# Patient Record
Sex: FEMALE | Race: WHITE | NOT HISPANIC OR LATINO
[De-identification: names, ages, dates, MRNs, and addresses within clinical notes are randomized per-mention and may not be internally consistent; named-entity substitution may affect disease eponyms.]

---

## 2022-08-08 ENCOUNTER — APPOINTMENT (OUTPATIENT)
Dept: AFTER HOURS CARE | Facility: EMERGENCY ROOM | Age: 87
End: 2022-08-08

## 2022-08-08 DIAGNOSIS — J02.8 ACUTE PHARYNGITIS DUE TO OTHER SPECIFIED ORGANISMS: ICD-10-CM

## 2022-08-08 DIAGNOSIS — U07.1 COVID-19: ICD-10-CM

## 2022-08-08 DIAGNOSIS — B97.89 ACUTE PHARYNGITIS DUE TO OTHER SPECIFIED ORGANISMS: ICD-10-CM

## 2022-08-08 PROBLEM — Z00.00 ENCOUNTER FOR PREVENTIVE HEALTH EXAMINATION: Status: ACTIVE | Noted: 2022-08-08

## 2022-08-08 PROCEDURE — 99442: CPT | Mod: CS,95

## 2022-08-08 NOTE — ASSESSMENT
[FreeTextEntry1] : Patient with sore throat, dry cough and improving rash to chest x6 days. Patient tested COVID+ Friday at urgent care, outside window for Paxlovid treatment, without significant risk factors requiring MAB. Patient endorses symptomatic improvement of rash and cough looking for further treatment for sore throat. No SOB, CP, vomiting complaints to warrant ED visit at this time. Patient made aware of concerning symptoms require ED visit and verbalized understanding. Discussed instruction over phone as pt does not have email, wrote down and repeated instructions back to me with good understanding.

## 2022-08-08 NOTE — REVIEW OF SYSTEMS
[Fever] : no fever [Chills] : no chills [Vision Problems] : no vision problems [Nasal Discharge] : nasal discharge [Sore Throat] : sore throat [Postnasal Drip] : postnasal drip [Chest Pain] : no chest pain [Palpitations] : no palpitations [Shortness Of Breath] : no shortness of breath [Cough] : cough [Abdominal Pain] : no abdominal pain [Nausea] : no nausea [Diarrhea] : diarrhea [Vomiting] : no vomiting [Dysuria] : no dysuria [Hematuria] : no hematuria [Joint Pain] : no joint pain [Muscle Pain] : no muscle pain [Itching] : Itching [Skin Rash] : skin rash [Headache] : no headache [Dizziness] : no dizziness

## 2022-08-08 NOTE — HISTORY OF PRESENT ILLNESS
[Home] : at home, [unfilled] , at the time of the visit. [Other Location: e.g. Home (Enter Location, City,State)___] : at [unfilled] [Verbal consent obtained from patient] : the patient, [unfilled] [FreeTextEntry8] : 87y F w/ PMHx Glaucoma complaining of sore throat x6 days. dry cough and mild rash to chest. Sore throat worse in morning and cough keeps her up at night. States she tested COVID+ Friday at an Urgent Care where she was prescribed tessalon perles and cortisone cream for rash. Patient states her rash has improved significantly since she started using topical cream, denies associated vesicles, blisters or drainage. States her cough has also improved since started prescribed cough medication. Denies fever, chills, dizziness, headache, chest pain, sob, n/v/d, dysuria, hematuria, syncope or falls. States she has close follow-up with her PMD regularly but he is currently on vacation x1 week. Is tolerating PO without issue, no difficulty speaking or swallowing.

## 2022-08-08 NOTE — PLAN
[No new medications perscribed] : Treat in place: No new medications prescribed [FreeTextEntry1] : -Continue with tessalon perles, and cortisone cream as prescribed\par -Tea w/ honey, elevation of head when sleeping\par -Tylenol as needed for sore throat\par -Strict ED instructions if develops shortness of breath, chest pain pain, dizziness or vomiting

## 2022-12-09 ENCOUNTER — OFFICE (OUTPATIENT)
Dept: URBAN - METROPOLITAN AREA CLINIC 122 | Facility: CLINIC | Age: 87
Setting detail: OPHTHALMOLOGY
End: 2022-12-09
Payer: MEDICARE

## 2022-12-09 DIAGNOSIS — H01.004: ICD-10-CM

## 2022-12-09 DIAGNOSIS — Z96.1: ICD-10-CM

## 2022-12-09 DIAGNOSIS — H01.002: ICD-10-CM

## 2022-12-09 DIAGNOSIS — H40.1123: ICD-10-CM

## 2022-12-09 DIAGNOSIS — H40.1112: ICD-10-CM

## 2022-12-09 DIAGNOSIS — H01.001: ICD-10-CM

## 2022-12-09 DIAGNOSIS — H01.005: ICD-10-CM

## 2022-12-09 PROCEDURE — 92012 INTRM OPH EXAM EST PATIENT: CPT | Performed by: OPHTHALMOLOGY

## 2022-12-09 ASSESSMENT — REFRACTION_MANIFEST
OD_SPHERE: +2.25
OS_CYLINDER: +0.75
OS_VA1: 20/25-
OS_AXIS: 180
OS_SPHERE: +2.25
OD_AXIS: 15
OD_VA1: 20/40+
OD_CYLINDER: +0.25

## 2022-12-09 ASSESSMENT — REFRACTION_CURRENTRX
OS_VPRISM_DIRECTION: PROGS
OS_AXIS: 5
OD_OVR_VA: 20/
OS_ADD: +2.50
OD_AXIS: 15
OS_SPHERE: +2.25
OS_CYLINDER: +0.25
OS_OVR_VA: 20/
OD_ADD: +2.50
OD_SPHERE: +2.25
OD_CYLINDER: +0.25
OD_VPRISM_DIRECTION: PROGS

## 2022-12-09 ASSESSMENT — VISUAL ACUITY
OS_BCVA: 20/25
OD_BCVA: 20/25-2

## 2022-12-09 ASSESSMENT — KERATOMETRY
OD_K1POWER_DIOPTERS: 43.25
OS_K1POWER_DIOPTERS: 43.25
OD_K2POWER_DIOPTERS: 43.52
OD_AXISANGLE_DEGREES: 41
OS_K2POWER_DIOPTERS: 43.75
OS_AXISANGLE_DEGREES: 2

## 2022-12-09 ASSESSMENT — SPHEQUIV_DERIVED
OS_SPHEQUIV: 2.625
OD_SPHEQUIV: 2.375
OS_SPHEQUIV: 3.25
OD_SPHEQUIV: 2.875

## 2022-12-09 ASSESSMENT — AXIALLENGTH_DERIVED
OD_AL: 22.74
OS_AL: 22.6117
OS_AL: 22.3902
OD_AL: 22.56

## 2022-12-09 ASSESSMENT — REFRACTION_AUTOREFRACTION
OS_AXIS: 178
OS_CYLINDER: +1.00
OS_SPHERE: +2.75
OD_CYLINDER: +1.25
OD_AXIS: 11
OD_SPHERE: +2.25

## 2022-12-09 ASSESSMENT — CONFRONTATIONAL VISUAL FIELD TEST (CVF)
OS_FINDINGS: FULL
OD_FINDINGS: FULL

## 2022-12-09 ASSESSMENT — LID EXAM ASSESSMENTS
OS_BLEPHARITIS: LLL LUL 1+
OD_BLEPHARITIS: RLL RUL 1+

## 2022-12-09 ASSESSMENT — PACHYMETRY
OD_CT_UM: 557
OD_CT_CORRECTION: -1
OS_CT_CORRECTION: -1
OS_CT_UM: 557

## 2023-01-20 ENCOUNTER — RX ONLY (RX ONLY)
Age: 88
End: 2023-01-20

## 2023-01-20 ENCOUNTER — OFFICE (OUTPATIENT)
Dept: URBAN - METROPOLITAN AREA CLINIC 122 | Facility: CLINIC | Age: 88
Setting detail: OPHTHALMOLOGY
End: 2023-01-20
Payer: MEDICARE

## 2023-01-20 DIAGNOSIS — H01.002: ICD-10-CM

## 2023-01-20 DIAGNOSIS — H01.005: ICD-10-CM

## 2023-01-20 DIAGNOSIS — Z96.1: ICD-10-CM

## 2023-01-20 DIAGNOSIS — H01.004: ICD-10-CM

## 2023-01-20 DIAGNOSIS — H40.1112: ICD-10-CM

## 2023-01-20 DIAGNOSIS — H01.001: ICD-10-CM

## 2023-01-20 DIAGNOSIS — H40.1123: ICD-10-CM

## 2023-01-20 PROCEDURE — 92012 INTRM OPH EXAM EST PATIENT: CPT | Performed by: OPHTHALMOLOGY

## 2023-01-20 ASSESSMENT — TONOMETRY
OS_IOP_MMHG: 12
OD_IOP_MMHG: 10

## 2023-01-20 ASSESSMENT — REFRACTION_MANIFEST
OS_SPHERE: +2.25
OD_AXIS: 15
OD_SPHERE: +2.25
OS_VA1: 20/25-
OD_CYLINDER: +0.25
OS_CYLINDER: +0.75
OD_VA1: 20/40+
OS_AXIS: 180

## 2023-01-20 ASSESSMENT — VISUAL ACUITY
OD_BCVA: 20/30
OS_BCVA: 20/25

## 2023-01-20 ASSESSMENT — REFRACTION_CURRENTRX
OD_SPHERE: +2.25
OS_OVR_VA: 20/
OS_SPHERE: +2.25
OD_AXIS: 15
OS_AXIS: 5
OD_OVR_VA: 20/
OD_VPRISM_DIRECTION: PROGS
OS_VPRISM_DIRECTION: PROGS
OD_CYLINDER: +0.25
OS_ADD: +2.50
OD_ADD: +2.50
OS_CYLINDER: +0.25

## 2023-01-20 ASSESSMENT — PACHYMETRY
OS_CT_UM: 557
OD_CT_CORRECTION: -1
OS_CT_CORRECTION: -1
OD_CT_UM: 557

## 2023-01-20 ASSESSMENT — SPHEQUIV_DERIVED
OD_SPHEQUIV: 2.375
OS_SPHEQUIV: 2.625
OD_SPHEQUIV: 2.875
OS_SPHEQUIV: 3.25

## 2023-01-20 ASSESSMENT — LID EXAM ASSESSMENTS
OD_BLEPHARITIS: RLL RUL 1+
OS_BLEPHARITIS: LLL LUL 1+

## 2023-01-20 ASSESSMENT — REFRACTION_AUTOREFRACTION
OD_SPHERE: +2.25
OD_CYLINDER: +1.25
OS_SPHERE: +2.75
OD_AXIS: 11
OS_AXIS: 178
OS_CYLINDER: +1.00

## 2023-01-20 ASSESSMENT — KERATOMETRY
OS_AXISANGLE_DEGREES: 2
OS_K2POWER_DIOPTERS: 43.75
OD_K2POWER_DIOPTERS: 43.52
OS_K1POWER_DIOPTERS: 43.25
OD_AXISANGLE_DEGREES: 41
OD_K1POWER_DIOPTERS: 43.25

## 2023-01-20 ASSESSMENT — AXIALLENGTH_DERIVED
OS_AL: 22.3902
OD_AL: 22.74
OS_AL: 22.6117
OD_AL: 22.56

## 2023-01-20 ASSESSMENT — CONFRONTATIONAL VISUAL FIELD TEST (CVF)
OD_FINDINGS: FULL
OS_FINDINGS: FULL

## 2023-05-30 ENCOUNTER — OFFICE (OUTPATIENT)
Dept: URBAN - METROPOLITAN AREA CLINIC 122 | Facility: CLINIC | Age: 88
Setting detail: OPHTHALMOLOGY
End: 2023-05-30
Payer: MEDICARE

## 2023-05-30 DIAGNOSIS — H01.005: ICD-10-CM

## 2023-05-30 DIAGNOSIS — H40.1112: ICD-10-CM

## 2023-05-30 DIAGNOSIS — H40.1123: ICD-10-CM

## 2023-05-30 DIAGNOSIS — Z96.1: ICD-10-CM

## 2023-05-30 DIAGNOSIS — H01.001: ICD-10-CM

## 2023-05-30 DIAGNOSIS — H01.004: ICD-10-CM

## 2023-05-30 DIAGNOSIS — H01.002: ICD-10-CM

## 2023-05-30 PROCEDURE — 92012 INTRM OPH EXAM EST PATIENT: CPT | Performed by: OPHTHALMOLOGY

## 2023-05-30 ASSESSMENT — VISUAL ACUITY
OS_BCVA: 20/25
OD_BCVA: 20/30

## 2023-05-30 ASSESSMENT — TONOMETRY
OD_IOP_MMHG: 11
OS_IOP_MMHG: 13

## 2023-05-30 ASSESSMENT — REFRACTION_AUTOREFRACTION
OS_SPHERE: +2.75
OD_AXIS: 11
OD_SPHERE: +2.25
OD_CYLINDER: +1.25
OS_AXIS: 178
OS_CYLINDER: +1.00

## 2023-05-30 ASSESSMENT — REFRACTION_CURRENTRX
OS_OVR_VA: 20/
OS_SPHERE: +2.25
OS_AXIS: 5
OD_ADD: +2.50
OD_AXIS: 15
OS_VPRISM_DIRECTION: PROGS
OD_CYLINDER: +0.25
OS_CYLINDER: +0.25
OD_SPHERE: +2.25
OD_OVR_VA: 20/
OS_ADD: +2.50
OD_VPRISM_DIRECTION: PROGS

## 2023-05-30 ASSESSMENT — REFRACTION_MANIFEST
OD_CYLINDER: +0.25
OS_SPHERE: +2.25
OS_AXIS: 180
OD_AXIS: 15
OD_SPHERE: +2.25
OD_VA1: 20/40+
OS_CYLINDER: +0.75
OS_VA1: 20/25-

## 2023-05-30 ASSESSMENT — KERATOMETRY
OS_K2POWER_DIOPTERS: 43.75
OD_K1POWER_DIOPTERS: 43.25
OS_AXISANGLE_DEGREES: 2
OD_AXISANGLE_DEGREES: 41
OS_K1POWER_DIOPTERS: 43.25
OD_K2POWER_DIOPTERS: 43.52

## 2023-05-30 ASSESSMENT — SPHEQUIV_DERIVED
OD_SPHEQUIV: 2.375
OS_SPHEQUIV: 3.25
OS_SPHEQUIV: 2.625
OD_SPHEQUIV: 2.875

## 2023-05-30 ASSESSMENT — AXIALLENGTH_DERIVED
OD_AL: 22.56
OS_AL: 22.6117
OD_AL: 22.74
OS_AL: 22.3902

## 2023-05-30 ASSESSMENT — PACHYMETRY
OS_CT_CORRECTION: -1
OS_CT_UM: 557
OD_CT_CORRECTION: -1
OD_CT_UM: 557

## 2023-05-30 ASSESSMENT — LID EXAM ASSESSMENTS
OD_BLEPHARITIS: RLL RUL 1+
OS_BLEPHARITIS: LLL LUL 1+

## 2023-11-03 ENCOUNTER — OFFICE (OUTPATIENT)
Dept: URBAN - METROPOLITAN AREA CLINIC 122 | Facility: CLINIC | Age: 88
Setting detail: OPHTHALMOLOGY
End: 2023-11-03
Payer: MEDICARE

## 2023-11-03 DIAGNOSIS — H01.004: ICD-10-CM

## 2023-11-03 DIAGNOSIS — H01.005: ICD-10-CM

## 2023-11-03 DIAGNOSIS — H01.002: ICD-10-CM

## 2023-11-03 DIAGNOSIS — Z96.1: ICD-10-CM

## 2023-11-03 DIAGNOSIS — H40.1123: ICD-10-CM

## 2023-11-03 DIAGNOSIS — H40.1112: ICD-10-CM

## 2023-11-03 DIAGNOSIS — H01.001: ICD-10-CM

## 2023-11-03 PROCEDURE — 92133 CPTRZD OPH DX IMG PST SGM ON: CPT | Performed by: OPHTHALMOLOGY

## 2023-11-03 PROCEDURE — 92014 COMPRE OPH EXAM EST PT 1/>: CPT | Performed by: OPHTHALMOLOGY

## 2023-11-03 ASSESSMENT — REFRACTION_AUTOREFRACTION
OS_CYLINDER: -1.25
OS_AXIS: 80
OD_AXIS: 126
OD_SPHERE: +0.50
OD_CYLINDER: -0.75
OS_SPHERE: +0.75

## 2023-11-03 ASSESSMENT — REFRACTION_CURRENTRX
OS_SPHERE: +2.50
OS_AXIS: 5
OD_SPHERE: +2.25
OD_SPHERE: +2.50
OD_OVR_VA: 20/
OS_CYLINDER: SPH
OS_CYLINDER: +0.25
OD_VPRISM_DIRECTION: SV
OD_OVR_VA: 20/
OD_AXIS: 15
OS_VPRISM_DIRECTION: PROGS
OS_VPRISM_DIRECTION: SV
OS_OVR_VA: 20/
OD_CYLINDER: SPH
OD_CYLINDER: +0.25
OD_ADD: +2.50
OS_SPHERE: +2.25
OS_ADD: +2.50
OS_OVR_VA: 20/
OD_VPRISM_DIRECTION: PROGS

## 2023-11-03 ASSESSMENT — LID EXAM ASSESSMENTS
OD_BLEPHARITIS: RLL RUL 1+
OS_BLEPHARITIS: LLL LUL 1+

## 2023-11-03 ASSESSMENT — REFRACTION_MANIFEST
OS_CYLINDER: +0.75
OD_SPHERE: +2.25
OD_CYLINDER: +0.25
OS_AXIS: 180
OD_VA1: 20/40+
OS_VA1: 20/25-
OS_SPHERE: +2.25
OD_AXIS: 15

## 2023-11-03 ASSESSMENT — SPHEQUIV_DERIVED
OS_SPHEQUIV: 2.625
OD_SPHEQUIV: 0.125
OS_SPHEQUIV: 0.125
OD_SPHEQUIV: 2.375

## 2024-02-09 ENCOUNTER — OFFICE (OUTPATIENT)
Dept: URBAN - METROPOLITAN AREA CLINIC 122 | Facility: CLINIC | Age: 89
Setting detail: OPHTHALMOLOGY
End: 2024-02-09
Payer: MEDICARE

## 2024-02-09 DIAGNOSIS — H40.1123: ICD-10-CM

## 2024-02-09 DIAGNOSIS — H01.001: ICD-10-CM

## 2024-02-09 DIAGNOSIS — Z96.1: ICD-10-CM

## 2024-02-09 DIAGNOSIS — H40.1112: ICD-10-CM

## 2024-02-09 DIAGNOSIS — H01.005: ICD-10-CM

## 2024-02-09 DIAGNOSIS — H01.002: ICD-10-CM

## 2024-02-09 DIAGNOSIS — H01.004: ICD-10-CM

## 2024-02-09 PROCEDURE — 92012 INTRM OPH EXAM EST PATIENT: CPT | Performed by: OPHTHALMOLOGY

## 2024-02-09 PROCEDURE — 92083 EXTENDED VISUAL FIELD XM: CPT | Performed by: OPHTHALMOLOGY

## 2024-02-09 ASSESSMENT — SPHEQUIV_DERIVED
OD_SPHEQUIV: 0.125
OS_SPHEQUIV: 2.625
OS_SPHEQUIV: 0.125
OD_SPHEQUIV: 2.375

## 2024-02-09 ASSESSMENT — REFRACTION_CURRENTRX
OS_SPHERE: +2.25
OS_CYLINDER: +0.25
OD_CYLINDER: SPH
OD_ADD: +2.50
OS_OVR_VA: 20/
OS_AXIS: 5
OS_OVR_VA: 20/
OD_AXIS: 15
OD_VPRISM_DIRECTION: PROGS
OD_CYLINDER: +0.25
OD_SPHERE: +2.50
OS_VPRISM_DIRECTION: PROGS
OD_SPHERE: +2.25
OD_VPRISM_DIRECTION: SV
OS_VPRISM_DIRECTION: SV
OD_OVR_VA: 20/
OD_OVR_VA: 20/
OS_CYLINDER: SPH
OS_SPHERE: +2.50
OS_ADD: +2.50

## 2024-02-09 ASSESSMENT — REFRACTION_AUTOREFRACTION
OS_AXIS: 80
OS_CYLINDER: -1.25
OD_CYLINDER: -0.75
OD_SPHERE: +0.50
OS_SPHERE: +0.75
OD_AXIS: 126

## 2024-02-09 ASSESSMENT — LID EXAM ASSESSMENTS
OS_BLEPHARITIS: LLL LUL 1+
OD_BLEPHARITIS: RLL RUL 1+

## 2024-02-09 ASSESSMENT — REFRACTION_MANIFEST
OS_SPHERE: +2.25
OD_AXIS: 15
OS_VA1: 20/25-
OS_AXIS: 180
OD_VA1: 20/40+
OS_CYLINDER: +0.75
OD_SPHERE: +2.25
OD_CYLINDER: +0.25

## 2024-03-15 ENCOUNTER — OFFICE (OUTPATIENT)
Dept: URBAN - METROPOLITAN AREA CLINIC 122 | Facility: CLINIC | Age: 89
Setting detail: OPHTHALMOLOGY
End: 2024-03-15
Payer: MEDICARE

## 2024-03-15 DIAGNOSIS — H01.002: ICD-10-CM

## 2024-03-15 DIAGNOSIS — H01.004: ICD-10-CM

## 2024-03-15 DIAGNOSIS — H40.1123: ICD-10-CM

## 2024-03-15 DIAGNOSIS — Z96.1: ICD-10-CM

## 2024-03-15 DIAGNOSIS — H40.1112: ICD-10-CM

## 2024-03-15 DIAGNOSIS — H01.001: ICD-10-CM

## 2024-03-15 DIAGNOSIS — H01.005: ICD-10-CM

## 2024-03-15 PROCEDURE — 92020 GONIOSCOPY: CPT | Performed by: OPHTHALMOLOGY

## 2024-03-15 PROCEDURE — 99214 OFFICE O/P EST MOD 30 MIN: CPT | Performed by: OPHTHALMOLOGY

## 2024-03-15 ASSESSMENT — REFRACTION_CURRENTRX
OD_VPRISM_DIRECTION: PROGS
OD_CYLINDER: +0.25
OS_ADD: +2.50
OD_VPRISM_DIRECTION: SV
OD_ADD: +2.50
OS_CYLINDER: +0.25
OD_OVR_VA: 20/
OS_VPRISM_DIRECTION: SV
OD_SPHERE: +2.50
OS_VPRISM_DIRECTION: PROGS
OD_SPHERE: +2.25
OS_SPHERE: +2.25
OS_CYLINDER: SPH
OS_AXIS: 5
OS_SPHERE: +2.50
OS_OVR_VA: 20/
OD_OVR_VA: 20/
OS_OVR_VA: 20/
OD_CYLINDER: SPH
OD_AXIS: 15

## 2024-03-15 ASSESSMENT — REFRACTION_MANIFEST
OS_AXIS: 180
OD_SPHERE: +2.25
OS_VA1: 20/25-
OD_CYLINDER: +0.25
OS_SPHERE: +2.25
OD_VA1: 20/40+
OS_CYLINDER: +0.75
OD_AXIS: 15

## 2024-03-15 ASSESSMENT — SPHEQUIV_DERIVED
OD_SPHEQUIV: 2.375
OS_SPHEQUIV: 2.625

## 2024-03-15 ASSESSMENT — LID EXAM ASSESSMENTS
OS_BLEPHARITIS: LLL LUL 1+
OD_BLEPHARITIS: RLL RUL 1+

## 2024-07-15 ENCOUNTER — RESULT REVIEW (OUTPATIENT)
Age: 89
End: 2024-07-15

## 2024-07-16 ENCOUNTER — APPOINTMENT (OUTPATIENT)
Dept: PAIN MANAGEMENT | Facility: CLINIC | Age: 89
End: 2024-07-16

## 2024-07-16 VITALS
OXYGEN SATURATION: 98 % | HEART RATE: 67 BPM | HEIGHT: 63 IN | BODY MASS INDEX: 27.46 KG/M2 | SYSTOLIC BLOOD PRESSURE: 160 MMHG | DIASTOLIC BLOOD PRESSURE: 102 MMHG | WEIGHT: 155 LBS

## 2024-07-16 DIAGNOSIS — M79.10 MYALGIA, UNSPECIFIED SITE: ICD-10-CM

## 2024-07-16 DIAGNOSIS — M47.816 SPONDYLOSIS W/OUT MYELOPATHY OR RADICULOPATHY, LUMBAR REGION: ICD-10-CM

## 2024-07-16 PROCEDURE — 99203 OFFICE O/P NEW LOW 30 MIN: CPT | Mod: 25

## 2024-07-16 PROCEDURE — 20552 NJX 1/MLT TRIGGER POINT 1/2: CPT

## 2024-07-16 RX ORDER — TRAMADOL HYDROCHLORIDE 100 MG/1
100 CAPSULE ORAL
Refills: 0 | Status: ACTIVE | COMMUNITY

## 2024-07-22 DIAGNOSIS — S32.020A WEDGE COMPRESSION FRACTURE OF SECOND LUMBAR VERTEBRA, INITIAL ENCOUNTER FOR CLOSED FRACTURE: ICD-10-CM

## 2024-07-29 ENCOUNTER — RESULT REVIEW (OUTPATIENT)
Age: 89
End: 2024-07-29

## 2024-07-30 ENCOUNTER — APPOINTMENT (OUTPATIENT)
Dept: PAIN MANAGEMENT | Facility: CLINIC | Age: 89
End: 2024-07-30

## 2024-08-05 ENCOUNTER — APPOINTMENT (OUTPATIENT)
Dept: PAIN MANAGEMENT | Facility: CLINIC | Age: 89
End: 2024-08-05

## 2024-08-05 PROBLEM — M48.061 LUMBAR STENOSIS: Status: ACTIVE | Noted: 2024-08-05

## 2024-08-05 PROCEDURE — 99214 OFFICE O/P EST MOD 30 MIN: CPT

## 2024-08-05 NOTE — PHYSICAL EXAM
[de-identified] : Physical Exam (Telemedicine):  Gen: AAOX3, NAD HEENT: PERRLA, EOMI, NCAT, NP Pulmonary: No Signs of Respiratory Distress MSK: AROM WFL, Limitations painful ROM Neurological: Grossly neurologically intact, Noted deficits none Gait: Normal, Non-antalgic, Sans AD Derm: No Rash, (-)Lesions, (-)Erythema, (-)Cyanosis  Psych: Calm, Cooperative, Conversational  Disclaimer: This is a limited examination for the purposes of conducting a telemedicine visit during the COVID-19 pandemic. Physical exam maneuvers were modified as necessary to allow patient to self perform. A focused physician physical examination will be performed during in person visits and should be referred to to determine need for further testing, interventions or degree of disability.

## 2024-08-05 NOTE — HISTORY OF PRESENT ILLNESS
[FreeTextEntry1] : Interval Note:  Since last visit the pain intensity has persisted    Medication has been allowing patient to go about activities of daily living with less pain.  Moving bowels regularly. No recent falls.   Patient denies any bowel/bladder incontinence, no saddle/perineal anesthesia or any other red flag signs or symptoms.  ---  HPI - Ms. Arminda Potter, an 89-year-old female referred by Dr Key with a history of back and joint pain, presents today with a chief complaint of sharp, dull, aching, throbbing, and stabbing pain localized to the lumbar spine and left hip, which began 9 days ago. The pain is constant, rated 8/10 on average, peaking at 9/10, and is exacerbated by activities such as laying, standing, walking, bending, and lifting. It is relieved by sitting, medications, heat, and ice. Ms. Potter has been managed by her general practitioner who prescribed tramadol and over-the-counter lidocaine patches; however, no imaging has been performed. She reports no numbness, weakness, or red flag symptoms such as bowel/bladder incontinence or saddle anesthesia, maintaining regular bowel movements. The pain significantly impairs her ability to perform daily activities and affects her emotional well-being.

## 2024-08-05 NOTE — REASON FOR VISIT
[Home] : at home, [unfilled] , at the time of the visit. [Medical Office: ([facility])___] : at the medical office located in  [Patient] : the patient [Follow-Up Visit] : a follow-up pain management visit

## 2024-08-05 NOTE — DATA REVIEWED
[FreeTextEntry1] :  CLINICAL INFORMATION: Back pain  ADDITIONAL CLINICAL INFORMATION: Spondylolisthesis M43.16  TECHNIQUE: Multiplanar, multisequence MRI was performed of the lumbar spine. IV Contrast: NONE  PRIOR STUDIES: No priors available for comparison.  FINDINGS:  LOCALIZER: No additional findings. BONES: There is a recent compression deformity of the L2 vertebral body resulting in approximately 20% loss of vertebral body height. There is minimal retropulsion of the posterior superior endplate contributing to mild to moderate bilateral neural foraminal narrowing at L1-L2, but no significant central canal narrowing. No associated epidural hematoma. T2 signal hyperintensity within the inferior sacrum is likely in keeping with additional nondisplaced fracture. ALIGNMENT: Grade 1 anterolisthesis at L5-S1. SACROILIAC JOINTS/SACRUM: There is a nondisplaced fracture of the inferior aspect of the sacrum. The SI joints are partially visualized but are intact. CONUS AND CAUDA EQUINA: The distal cord and conus are normal in signal. Conus terminates at L1. VISUALIZED INTRAPELVIC/INTRA-ABDOMINAL SOFT TISSUES: Normal. PARASPINAL SOFT TISSUES: Normal.   INDIVIDUAL LEVELS:  LOWER THORACIC SPINE: No spinal canal or neuroforaminal stenosis.  L1-L2: Retropulsion of the posterior superior L2 endplate, mild to moderate bilateral facet arthropathy and broad-based posterior disc bulge contribute to mild to moderate bilateral neural foraminal narrowing but no significant central canal narrowing. L2-L3: Small posterior disc bulge and moderate bilateral facet arthropathy result in mild to moderate bilateral neural foraminal narrowing but no significant central canal narrowing. L3-L4: Minimal posterior osseous ridging and moderate bilateral facet arthropathy result in moderate central canal narrowing, mild left neural foraminal narrowing and mild to moderate right neural foraminal narrowing. L4-L5: Broad-based posterior disc bulge and moderate bilateral facet arthropathy result in moderate bilateral neural foraminal narrowing and mild to moderate central canal narrowing. There is bilateral lateral recess stenosis. L5-S1: Grade 1 anterolisthesis. Posterior osseous ridging and moderate bilateral facet arthropathy result in mild bilateral neural foraminal narrowing but no significant central canal narrowing.   IMPRESSION:  There is a recent compression deformity of the L2 vertebral body resulting in approximately 20% loss of vertebral body height. There is minimal retropulsion of the posterior superior endplate contributing to mild to moderate bilateral neural foraminal narrowing at L1-L2, but no significant central canal narrowing. No associated epidural hematoma. There is an additional nondisplaced fracture of the inferior sacrum.  Multilevel discogenic degenerative disease and facet arthropathy of the lumbar spine, most pronounced at L4-L5 where there is moderate bilateral neural foraminal narrowing and mild to moderate central canal narrowing. Additional varying degrees of central canal and neural foraminal narrowing, as above.  --- End of Report ---

## 2024-08-05 NOTE — ASSESSMENT
[FreeTextEntry1] : Ms. NELLY GODOY is a 89 year F suffering from low back pain, that based upon today's subjective complaints, physical examination, and chart review, is likely secondary to lumbar spondylosis with element of myalgia  >> Medications  Chronic opioid use for non-malignant pain, in particular at high doses would not be recommended since it can potentially lead to hyperalgesia (hypersensitivity), tolerance and addiction.    Gabapentin 100 mg po qhs   >> Interventions    Arrange for L2 Kyphoplasty under fluoroscopic guidance. Success rate and possible complications discussed with patient in detail.  - PCN allergy noted  L2 Compression fracture S32.418S 30159 Kyphoplasty Lumbar    >> Therapy and Other Modalities    Continue with TLSO  Continue with daily home stretching regimen  >> Imaging and Other Studies  I have personally reviewed the images in detail together with the patient today, and I have answered all questions regarding this condition to the best of my ability, to the patient's satisfaction.  >> Consults  None ordered

## 2024-08-19 ENCOUNTER — APPOINTMENT (OUTPATIENT)
Dept: PAIN MANAGEMENT | Facility: CLINIC | Age: 89
End: 2024-08-19
Payer: COMMERCIAL

## 2024-08-19 VITALS
SYSTOLIC BLOOD PRESSURE: 211 MMHG | WEIGHT: 155 LBS | BODY MASS INDEX: 27.46 KG/M2 | HEIGHT: 63 IN | DIASTOLIC BLOOD PRESSURE: 182 MMHG | OXYGEN SATURATION: 98 % | HEART RATE: 67 BPM

## 2024-08-19 DIAGNOSIS — S32.020A WEDGE COMPRESSION FRACTURE OF SECOND LUMBAR VERTEBRA, INITIAL ENCOUNTER FOR CLOSED FRACTURE: ICD-10-CM

## 2024-08-19 PROCEDURE — 99214 OFFICE O/P EST MOD 30 MIN: CPT

## 2024-08-19 NOTE — DATA REVIEWED
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 Mother    • Hypertension Mother    • Dementia Mother    • Other (Other) Mother    • Heart Disorder Father    • Prostate Cancer Father    • Other (RA) Father    • Other (SLE,RA,DM) Sister         I reviewed her's Past Medical History, Past Surgical [FreeTextEntry1] : [facility]                                          [address] [place] [address]                                             [phone]  PATIENT NAME:           NELLY GODOY                      YOB: 1935 ORDERING MD:           Horacio Soto DO PRIMARY CARE MD:           Jag Key MD                      ATTENDING MD:           Horacio Aviles DO MEDICAL REC#:           [id number]                       ACCOUNT#:             [id number] LOCATION:             [facility]                            ORDER DATE/TIME:           07/29/24 1747 PROCEDURE:            MRI LUMBAR SPINE  ORDER #:              [id number] CC:                                         ;                     ;                     ; End of cc's  CLINICAL INFORMATION: Back pain  ADDITIONAL CLINICAL INFORMATION: Spondylolisthesis M43.16  TECHNIQUE: Multiplanar, multisequence MRI was performed of the lumbar spine. IV Contrast: NONE  PRIOR STUDIES: No priors available for comparison.  FINDINGS:  LOCALIZER: No additional findings. BONES: There is a recent compression deformity of the L2 vertebral body resulting in approximately 20% loss of vertebral body height. There is minimal retropulsion of the posterior superior endplate contributing to mild to moderate bilateral neural foraminal narrowing at L1-L2, but no significant central canal narrowing. No associated epidural hematoma. T2 signal hyperintensity within the inferior sacrum is likely in keeping with additional nondisplaced fracture. ALIGNMENT: Grade 1 anterolisthesis at L5-S1. SACROILIAC JOINTS/SACRUM: There is a nondisplaced fracture of the inferior aspect of the sacrum. The SI joints are partially visualized but are intact. CONUS AND CAUDA EQUINA: The distal cord and conus are normal in signal. Conus terminates at L1. VISUALIZED INTRAPELVIC/INTRA-ABDOMINAL SOFT TISSUES: Normal. PARASPINAL SOFT TISSUES: Normal.   INDIVIDUAL LEVELS:  LOWER THORACIC SPINE: No spinal canal or neuroforaminal stenosis.  L1-L2: Retropulsion of the posterior superior L2 endplate, mild to moderate bilateral facet arthropathy and broad-based posterior disc bulge contribute to mild to moderate bilateral neural foraminal narrowing but no significant central canal narrowing. L2-L3: Small posterior disc bulge and moderate bilateral facet arthropathy result in mild to moderate bilateral neural foraminal narrowing but no significant central canal narrowing. L3-L4: Minimal posterior osseous ridging and moderate bilateral facet arthropathy result in moderate central canal narrowing, mild left neural foraminal narrowing and mild to moderate right neural foraminal narrowing. L4-L5: Broad-based posterior disc bulge and moderate bilateral facet arthropathy result in moderate bilateral neural foraminal narrowing and mild to moderate central canal narrowing. There is bilateral lateral recess stenosis. L5-S1: Grade 1 anterolisthesis. Posterior osseous ridging and moderate bilateral facet arthropathy result in mild bilateral neural foraminal narrowing but no significant central canal narrowing.   IMPRESSION:  There is a recent compression deformity of the L2 vertebral body resulting in approximately 20% loss of vertebral body height. There is minimal retropulsion of the posterior superior endplate contributing to mild to moderate bilateral neural foraminal narrowing at L1-L2, but no significant central canal narrowing. No associated epidural hematoma. There is an additional nondisplaced fracture of the inferior sacrum.  Multilevel discogenic degenerative disease and facet arthropathy of the lumbar spine, most pronounced at L4-L5 where there is moderate bilateral neural foraminal narrowing and mild to moderate central canal narrowing. Additional varying degrees of central canal and neural foraminal narrowing, as above.  --- End of Report ---           Electronically Signed:          ____________________________________________          CHIMERE WILLIAM WEBBER          08/02/24 1139Ms. NELLY GODOY is a 89 year F suffering from [***] pain, that based upon today's subjective complaints, physical examination, and [***] review, is likely [***].  >> Medications  Chronic opioid use for non-malignant pain, in particular at high doses would not be recommended since it can potentially lead to hyperalgesia (hypersensitivity), tolerance and addiction.       >> Interventions   None indicated at this time   >> Therapy and Other Modalities   Continue with daily home stretching regimen  >> Imaging and Other Studies  See Media   >> Consults  None ordered  Estimated body mass index is 25.96 kg/m² as calculated from the following:    Height as of this encounter: 5' 5\" (1.651 m). Weight as of this encounter: 156 lb (70.8 kg). Vital signs reviewed. Appears stated age, well groomed.   Physical Exam  Vitals consistent with a bony prominence but will get ultrasound to examine further in setting of history of appendiceal cancer.  -     US THYROID ([id number]);  Future    Atypical mole -f/u derm since mole is new and in a location where potential for repeated trau

## 2024-08-19 NOTE — HISTORY OF PRESENT ILLNESS
[FreeTextEntry1] : Interval Note:  Since last visit the pain intensity has progressed   Pain today 8/10 described as constant aching, burning, sharp, shooting, throbbing and stabbing. Pain is worse with any movement. Patient presents with DAUGHTER - CRISTINA to discuss Kyphoplasty procedure.  Moving bowels regularly. No recent falls.   Patient denies any bowel/bladder incontinence, no saddle/perineal anesthesia or any other red flag signs or symptoms.  ---  HPI - Ms. Arminda Potter, an 89-year-old female referred by Dr Key with a history of back and joint pain, presents today with a chief complaint of sharp, dull, aching, throbbing, and stabbing pain localized to the lumbar spine and left hip, which began 9 days ago. The pain is constant, rated 8/10 on average, peaking at 9/10, and is exacerbated by activities such as laying, standing, walking, bending, and lifting. It is relieved by sitting, medications, heat, and ice. Ms. Potter has been managed by her general practitioner who prescribed tramadol and over-the-counter lidocaine patches; however, no imaging has been performed. She reports no numbness, weakness, or red flag symptoms such as bowel/bladder incontinence or saddle anesthesia, maintaining regular bowel movements. The pain significantly impairs her ability to perform daily activities and affects her emotional well-being.

## 2024-08-19 NOTE — REVIEW OF SYSTEMS
[Back Pain] : back pain [Joint Pain] : joint pain [Muscle Pain] : muscle pain [Decreased ROM] : decreased range of motion

## 2024-08-19 NOTE — ASSESSMENT
[FreeTextEntry1] : Ms. NELLY GODOY is a 89 year F suffering from low back pain, that based upon today's subjective complaints, physical examination, and chart review, is likely secondary to  L2 compression fracture  >> Medications  Chronic opioid use for non-malignant pain, in particular at high doses would not be recommended since it can potentially lead to hyperalgesia (hypersensitivity), tolerance and addiction.    >> Interventions  Reports being bed bound due to the pain for the past 6 weeks despite taking her pain medication.  Recommend L2 Kyphoplasty to treat her persistent back pain and findings seen on radiology testing. Pain is poorly controlled despite pain medication and limiting ambulation putting patient at risk for DVT, atelectasis, PNA and further deconditioning.      >> Therapy and Other Modalities  Continue with daily home stretching regimen  >> Imaging and Other Studies  XRAY Lumbar Spine and Pelvis  >> Consults  None ordered

## 2024-08-19 NOTE — PHYSICAL EXAM
[de-identified] : General Appearance: Level of consciousness: Alert Body habitus: Well-nourished Signs of distress: Some noticeable discomfort. Hygiene: Clean   Psychiatric: Appropriate mood and affect. Cooperative.   Skin: Nailbeds pink with no cyanosis or clubbing. Lesions or rashes: None observed   Head and Neck: The head is normocephalic and atraumatic Eyes: Conjunctivae  clear without exudates. Sclera is non-icteric Ears: No discharge. Hearing is intact with good acuity Nose: No discharge. No nasal flaring Mouth and throat: Trachea Midline, teeth and gums are without obvious lesion   Respiratory: Breathing pattern: Normal Chest movement: Symmetrical Use of accessory muscles: Absent Cough: Absent Wheezing: Absent   Cardiovascular System: Pulse: Regular Cyanosis: Absent   Abdomen: Inspection: No distention Visible pulsations: Absent   Musculoskeletal System: Muscle strength:   strength is normal bilaterally. Gait: Steady, NO assistive device Posture: Upright Rises from a seated position with SOME difficulty due to pain     Spine: No gross deformity or signs of trauma. Curvature of the cervical, thoracic, and lumbar spine are within normal limits. Spinous processes are midline. NO tenderness of spinous processes. Paraspinal musculature is NOT hypertonic NO discomfort is noted with flexion MILD discomfort is noted with extension No discomfort is noted with side-to-side rotation ++ TTP L2 spinous process     Neurological System: Cranial nerves 2-12: Grossly intact Motor function: Moving all extremities against gravity Dorsi/plantar flexion is normal bilaterally. Sensation: No gross deficit to light touch

## 2024-09-06 ENCOUNTER — APPOINTMENT (OUTPATIENT)
Dept: PAIN MANAGEMENT | Facility: CLINIC | Age: 89
End: 2024-09-06

## 2024-09-06 ENCOUNTER — TRANSCRIPTION ENCOUNTER (OUTPATIENT)
Age: 89
End: 2024-09-06

## 2024-09-06 ENCOUNTER — RESULT REVIEW (OUTPATIENT)
Age: 89
End: 2024-09-06

## 2024-09-13 ENCOUNTER — APPOINTMENT (OUTPATIENT)
Dept: PAIN MANAGEMENT | Facility: CLINIC | Age: 89
End: 2024-09-13
Payer: COMMERCIAL

## 2024-09-13 DIAGNOSIS — M79.10 MYALGIA, UNSPECIFIED SITE: ICD-10-CM

## 2024-09-13 DIAGNOSIS — S32.020A WEDGE COMPRESSION FRACTURE OF SECOND LUMBAR VERTEBRA, INITIAL ENCOUNTER FOR CLOSED FRACTURE: ICD-10-CM

## 2024-09-13 PROCEDURE — 99024 POSTOP FOLLOW-UP VISIT: CPT

## 2024-09-13 NOTE — ASSESSMENT
[FreeTextEntry1] : Ms. NELLY GODOY is a 89 year F suffering from low back pain, that based upon today's subjective complaints, physical examination, and chart review, is likely secondary to  L2 compression fracture  >> Medications  Chronic opioid use for non-malignant pain, in particular at high doses would not be recommended since it can potentially lead to hyperalgesia (hypersensitivity), tolerance and addiction.    >> Interventions  Good relief following Kyphoplasty  >> Therapy and Other Modalities  Continue with daily home stretching regimen  >> Imaging and Other Studies  XRAY Lumbar Spine and Pelvis  >> Consults  None ordered

## 2024-09-13 NOTE — HISTORY OF PRESENT ILLNESS
[FreeTextEntry1] : Interval Note:  Had L2 Kyphoplasty Sep 6 with excellent relief  Moving bowels regularly. No recent falls.   Patient denies any bowel/bladder incontinence, no saddle/perineal anesthesia or any other red flag signs or symptoms.  ---  HPI - Ms. Arminda Potter, an 89-year-old female referred by Dr Key with a history of back and joint pain, presents today with a chief complaint of sharp, dull, aching, throbbing, and stabbing pain localized to the lumbar spine and left hip, which began 9 days ago. The pain is constant, rated 8/10 on average, peaking at 9/10, and is exacerbated by activities such as laying, standing, walking, bending, and lifting. It is relieved by sitting, medications, heat, and ice. Ms. Potter has been managed by her general practitioner who prescribed tramadol and over-the-counter lidocaine patches; however, no imaging has been performed. She reports no numbness, weakness, or red flag symptoms such as bowel/bladder incontinence or saddle anesthesia, maintaining regular bowel movements. The pain significantly impairs her ability to perform daily activities and affects her emotional well-being.

## 2024-09-13 NOTE — PHYSICAL EXAM
[de-identified] : Physical Exam (Telemedicine):  Gen: AAOX3, NAD HEENT: PERRLA, EOMI, NCAT, NP Pulmonary: No Signs of Respiratory Distress MSK: AROM WFL, Limitations ROM WNL Neurological: Grossly neurologically intact, Noted deficits none Gait: Normal, Non-antalgic, Sans AD Derm: No Rash, (-)Lesions, (-)Erythema, (-)Cyanosis  Psych: Calm, Cooperative, Conversational  Disclaimer: This is a limited examination for the purposes of conducting a telemedicine visit during the COVID-19 pandemic. Physical exam maneuvers were modified as necessary to allow patient to self perform. A focused physician physical examination will be performed during in person visits and should be referred to to determine need for further testing, interventions or degree of disability.

## 2024-09-13 NOTE — REASON FOR VISIT
[Home] : at home, [unfilled] , at the time of the visit. [Medical Office: ([facility])___] : at the medical office located in  [Patient] : the patient [Follow-Up Visit] : a follow-up pain management visit